# Patient Record
(demographics unavailable — no encounter records)

---

## 2025-04-30 NOTE — HISTORY OF PRESENT ILLNESS
[de-identified] : Seasonal allergic rhinoconjunctivitis:  Pataday/Genteal - Flonase 2 puffs QD - Levocetirizine 5 mg QHS - Singulair 5 mg QD - Flovent 44 2 puffs QAM Albuterol 2 puffs QID Prn  He has been taking Singulair and Xyzal at bedtime every night.

## 2025-04-30 NOTE — PHYSICAL EXAM
[Alert] : alert [Well Nourished] : well nourished [No Acute Distress] : no acute distress [Sclera Not Icteric] : sclera not icteric [Conjunctival Erythema] : no conjunctival erythema [No Neck Mass] : no neck mass was observed [No LAD] : no lymphadenopathy [No Thyroid Mass] : no thyroid mass [Supple] : the neck was supple [Normal Rate and Effort] : normal respiratory rhythm and effort [No Crackles] : no crackles [No Retractions] : no retractions [Bilateral Audible Breath Sounds] : bilateral audible breath sounds [Wheezing] : no wheezing was heard [Normal Rate] : heart rate was normal  [Normal S1, S2] : normal S1 and S2 [No murmur] : no murmur [Regular Rhythm] : with a regular rhythm [Normal Cervical Lymph Nodes] : cervical [Skin Intact] : skin intact  [No Rash] : no rash [No clubbing] : no clubbing [Normal Mood] : mood was normal [Normal Affect] : affect was normal [Judgment and Insight Age Appropriate] : judgement and insight is age appropriate [Alert, Awake, Oriented as Age-Appropriate] : alert, awake, oriented as age appropriate

## 2025-04-30 NOTE — ASSESSMENT
[FreeTextEntry1] : Seasonal allergic rhinoconjunctivitis:  Pataday/Genteal Flonase 2 puffs QD Levocetirizine 5 mg QHS   Mild persistent asthma:  Singulair 5 mg QD Flovent 44 2 puffs QAM  Albuterol 2 puffs QID Prn   RV 1 year